# Patient Record
Sex: FEMALE | Race: WHITE | NOT HISPANIC OR LATINO | Employment: OTHER | URBAN - NONMETROPOLITAN AREA
[De-identification: names, ages, dates, MRNs, and addresses within clinical notes are randomized per-mention and may not be internally consistent; named-entity substitution may affect disease eponyms.]

---

## 2017-03-15 ENCOUNTER — OFFICE VISIT (OUTPATIENT)
Dept: SURGERY | Facility: CLINIC | Age: 52
End: 2017-03-15

## 2017-03-15 VITALS — BODY MASS INDEX: 44.26 KG/M2 | WEIGHT: 282 LBS | HEIGHT: 67 IN

## 2017-03-15 DIAGNOSIS — M72.6 NECROTIZING FASCIITIS (HCC): Primary | ICD-10-CM

## 2017-03-15 PROCEDURE — 99212 OFFICE O/P EST SF 10 MIN: CPT | Performed by: SURGERY

## 2017-03-15 RX ORDER — INSULIN DETEMIR 100 [IU]/ML
INJECTION, SOLUTION SUBCUTANEOUS
Refills: 12 | COMMUNITY
Start: 2017-01-26

## 2017-03-15 RX ORDER — ERGOCALCIFEROL 1.25 MG/1
CAPSULE ORAL
Refills: 0 | COMMUNITY
Start: 2017-02-07

## 2017-03-15 RX ORDER — INSULIN ASPART 100 [IU]/ML
INJECTION, SUSPENSION SUBCUTANEOUS
Refills: 12 | COMMUNITY
Start: 2017-02-03

## 2017-03-15 RX ORDER — FUROSEMIDE 20 MG/1
TABLET ORAL
Refills: 2 | COMMUNITY
Start: 2016-12-30

## 2017-03-15 RX ORDER — LEVOTHYROXINE SODIUM 0.03 MG/1
TABLET ORAL
Refills: 12 | COMMUNITY
Start: 2016-12-30

## 2017-03-15 RX ORDER — EMPAGLIFLOZIN 25 MG/1
TABLET, FILM COATED ORAL
Refills: 5 | COMMUNITY
Start: 2016-12-30

## 2017-03-15 RX ORDER — GABAPENTIN 600 MG/1
TABLET ORAL
Refills: 0 | COMMUNITY
Start: 2017-01-31

## 2017-03-15 NOTE — PROGRESS NOTES
Subjective   Loan Yates is a 51 y.o. female  is here today for follow-up.         Loan Yates is a 51 y.o. female here for follow up  After excisional debridement for necrotizing fasciitis of th lower abdomen.  The patient had extensive debridement of skin and subcutaneous fat fascia and muscle of the anterior abdominal wall.    General Appearance:  awake, alert, oriented, in no acute distress and  obese  Skin:   abdominal wound granlating, defect large measuring [id number] with depth of 10cm  Lungs:  Normal expansion.  Clear to auscultation.  No rales, rhonchi, or wheezing.  Heart:  Heart regular rate and rhythm  Abdomen:  Soft, non-tender, normal bowel sounds; no bruits, organomegaly or masses.  See skin exam  Extremities: Extremities warm to touch, pink, with no edema.  Musculoskeletal:  negative          Loan was seen today for hospital follow up i&d.    Diagnoses and all orders for this visit:    Necrotizing fasciitis        Assessment     Loan Yates is a 51 y.o. female with necrotizing fasciitis of the anterior abdominal wall.  Her wound is granulating well and she will continue BID dressing changes with saline guaze.  She will follow up in 1 month.

## 2017-03-16 ENCOUNTER — TELEPHONE (OUTPATIENT)
Dept: SURGERY | Facility: CLINIC | Age: 52
End: 2017-03-16

## 2017-03-16 NOTE — TELEPHONE ENCOUNTER
Called Loan and got her physical address and set her up with [facility] I spoke with Enriqueta and gave her the Dx and everything this morning she states she would get everything set up immediately. Loan is aware.

## 2017-04-12 ENCOUNTER — OFFICE VISIT (OUTPATIENT)
Dept: SURGERY | Facility: CLINIC | Age: 52
End: 2017-04-12

## 2017-04-12 VITALS — BODY MASS INDEX: 43.95 KG/M2 | WEIGHT: 280 LBS | HEIGHT: 67 IN

## 2017-04-12 DIAGNOSIS — M72.6 NECROTIZING FASCIITIS (HCC): Primary | ICD-10-CM

## 2017-04-12 PROCEDURE — 99212 OFFICE O/P EST SF 10 MIN: CPT | Performed by: SURGERY

## 2017-04-13 NOTE — PROGRESS NOTES
Subjective   Loan Yates is a 51 y.o. female  is here today for follow-up.         Loan Yates is a 51 y.o. female here for follow up for a wound check after large abdominal wall debridement for necrotizing fasciitis.  She's been performing wet-to-dry twice a day dressing changes at home since discharge and her wound is granulating nicely.  The wound is completely filled with granulation tissue with the exception of 2 lateral tunnels better approximately 3 cm deep.  There is no evidence of persistent infection.  The surface of the wound now measures 8 cm x 7 cm.      Loan was seen today for monthly follow up for i&d.    Diagnoses and all orders for this visit:    Necrotizing fasciitis        Assessment     Loan Yates is a 51 y.o. female with open wound is healing nicely after excisional debridement for necrotizing fasciitis.  The patient will continue current wound care and follow-up in 1 month.

## 2017-05-16 ENCOUNTER — OFFICE VISIT (OUTPATIENT)
Dept: SURGERY | Facility: CLINIC | Age: 52
End: 2017-05-16

## 2017-05-16 VITALS — WEIGHT: 280 LBS | BODY MASS INDEX: 43.95 KG/M2 | HEIGHT: 67 IN

## 2017-05-16 DIAGNOSIS — M72.6 NECROTIZING FASCIITIS (HCC): Primary | ICD-10-CM

## 2017-05-16 PROCEDURE — 99212 OFFICE O/P EST SF 10 MIN: CPT | Performed by: SURGERY

## 2018-02-06 ENCOUNTER — OFFICE VISIT (OUTPATIENT)
Dept: NEUROSURGERY | Facility: CLINIC | Age: 53
End: 2018-02-06

## 2018-02-06 ENCOUNTER — APPOINTMENT (OUTPATIENT)
Dept: LAB | Facility: HOSPITAL | Age: 53
End: 2018-02-06

## 2018-02-06 VITALS
TEMPERATURE: 97.2 F | HEIGHT: 67 IN | WEIGHT: 281.6 LBS | SYSTOLIC BLOOD PRESSURE: 110 MMHG | DIASTOLIC BLOOD PRESSURE: 80 MMHG | BODY MASS INDEX: 44.2 KG/M2

## 2018-02-06 DIAGNOSIS — M51.36 DEGENERATIVE DISC DISEASE, LUMBAR: Primary | ICD-10-CM

## 2018-02-06 DIAGNOSIS — E13.8 DIABETES MELLITUS OF OTHER TYPE WITH COMPLICATION, UNSPECIFIED LONG TERM INSULIN USE STATUS: ICD-10-CM

## 2018-02-06 LAB — HBA1C MFR BLD: 11.9 % (ref 4.8–5.6)

## 2018-02-06 PROCEDURE — 83036 HEMOGLOBIN GLYCOSYLATED A1C: CPT | Performed by: NEUROLOGICAL SURGERY

## 2018-02-06 PROCEDURE — 99203 OFFICE O/P NEW LOW 30 MIN: CPT | Performed by: NEUROLOGICAL SURGERY

## 2018-02-06 PROCEDURE — 36415 COLL VENOUS BLD VENIPUNCTURE: CPT | Performed by: NEUROLOGICAL SURGERY

## 2018-02-06 RX ORDER — NABUMETONE 750 MG/1
750 TABLET, FILM COATED ORAL 2 TIMES DAILY
Qty: 60 TABLET | Refills: 0 | Status: SHIPPED | OUTPATIENT
Start: 2018-02-06 | End: 2018-03-15 | Stop reason: SDUPTHER

## 2018-02-06 RX ORDER — METHOCARBAMOL 750 MG/1
750 TABLET, FILM COATED ORAL NIGHTLY
Qty: 30 TABLET | Refills: 0 | Status: SHIPPED | OUTPATIENT
Start: 2018-02-06 | End: 2018-03-08

## 2018-02-06 NOTE — PROGRESS NOTES
Loan COSTA [facility]  1965  [id number]      Chief Complaint   Patient presents with   • Back Pain   • Leg Pain     right       HISTORY OF PRESENT ILLNESS:   This is a 52-year-old diabetic female referred with a chief complaint low back pain.  The pain is axial and nonradicular.  She offers no symptoms to suggest nerve root or spinal cord compression.  Lumbar spine x-rays were obtained and she referred for neurosurgical consultation.    Past Medical History:   Diagnosis Date   • Diabetes mellitus        Past Surgical History:   Procedure Laterality Date   •  SECTION     • INCISION AND DRAINAGE ABSCESS     • LAPAROSCOPIC CHOLECYSTECTOMY         Family History   Problem Relation Age of Onset   • Diabetes Mother    • Heart disease Mother    • Hypertension Mother    • Diabetes Father    • Heart disease Father    • Hypertension Father    • Diabetes Sister    • Cancer Brother        Social History     Social History   • Marital status: Legally      Spouse name: N/A   • Number of children: N/A   • Years of education: N/A     Occupational History   • Not on file.     Social History Main Topics   • Smoking status: Current Every Day Smoker     Packs/day: 0.50     Types: Cigarettes   • Smokeless tobacco: Not on file   • Alcohol use No   • Drug use: No   • Sexual activity: Defer     Other Topics Concern   • Not on file     Social History Narrative       Allergies   Allergen Reactions   • Glucocerebrosidase Replenishers          Current Outpatient Prescriptions:   •  furosemide (LASIX) 20 MG tablet, , Disp: , Rfl: 2  •  gabapentin (NEURONTIN) 600 MG tablet, , Disp: , Rfl: 0  •  JARDIANCE 25 MG tablet, , Disp: , Rfl: 5  •  LEVEMIR FLEXTOUCH 100 UNIT/ML injection, , Disp: , Rfl: 12  •  levothyroxine (SYNTHROID, LEVOTHROID) 25 MCG tablet, , Disp: , Rfl: 12  •  NOVOLOG MIX 70/30 FLEXPEN (70-30) 100 UNIT/ML suspension pen-injector injection, , Disp: , Rfl: 12  •  vitamin D (ERGOCALCIFEROL) 70285 UNITS capsule capsule,  , Disp: , Rfl: 0    Review of Systems   Constitutional: Positive for activity change. Negative for appetite change, chills, diaphoresis, fatigue, fever and unexpected weight change.   HENT: Negative for congestion, dental problem, drooling, ear discharge, ear pain, facial swelling, hearing loss, mouth sores, nosebleeds, postnasal drip, rhinorrhea, sinus pressure, sneezing, sore throat, tinnitus, trouble swallowing and voice change.    Eyes: Negative for photophobia, pain, discharge, redness, itching and visual disturbance.   Respiratory: Negative for apnea, cough, choking, chest tightness, shortness of breath, wheezing and stridor.    Cardiovascular: Positive for leg swelling. Negative for chest pain and palpitations.   Gastrointestinal: Negative for abdominal distention, abdominal pain, anal bleeding, blood in stool, constipation, diarrhea, nausea, rectal pain and vomiting.   Endocrine: Positive for polydipsia. Negative for cold intolerance, heat intolerance, polyphagia and polyuria.   Genitourinary: Negative for decreased urine volume, difficulty urinating, dysuria, enuresis, flank pain, frequency, genital sores, hematuria and urgency.   Musculoskeletal: Positive for arthralgias, back pain, gait problem, joint swelling, myalgias, neck pain and neck stiffness.   Skin: Negative for color change, pallor, rash and wound.   Allergic/Immunologic: Positive for environmental allergies. Negative for food allergies and immunocompromised state.   Neurological: Positive for numbness and headaches. Negative for dizziness, tremors, seizures, syncope, facial asymmetry, speech difficulty, weakness and light-headedness.   Hematological: Negative for adenopathy. Does not bruise/bleed easily.   Psychiatric/Behavioral: Negative for agitation, behavioral problems, confusion, decreased concentration, dysphoric mood, hallucinations, self-injury, sleep disturbance and suicidal ideas. The patient is not nervous/anxious and is not  "hyperactive.        Vitals:    02/06/18 1209   BP: 110/80   Temp: 97.2 °F (36.2 °C)   Weight: 128 kg (281 lb 9.6 oz)   Height: 170.2 cm (67.01\")       Neurological Examination:    Mental status/speech: The patient is alert and oriented.  Speech is clear without aphysia or dysarthria.  No overt cognitive deficits.    Cranial nerve examination:    Olfaction: Smell is intact.  Vision: Vision is intact without visual field abnormalities.  Funduscopic examination is normal.  No pupillary irregularity.  Ocular motor examination: The extraocular muscles are intact.  There is no diplopia.  The pupil is round and reactive to both light and accommodation.  There is no nystagmus.  Facial movement/sensation: There is no facial weakness.  Sensation is intact in the first, second, and third divisions of the trigeminal nerve.  The corneal reflex is intact.  Auditory: Hearing is intact to finger rub bilaterally.  Cranial nerves IX, X, XI, XII: Phonation is normal.  No dysphagia.  Tongue is protruded in the midline without atrophy.  The gag reflex is intact.  Shoulder shrug is normal.    Musculoligamentous ligamentous examination: She is obese with limitation range of motion of the spine.  Straight leg raising [place] flip and Scovill test are negative.  There is no weakness sensory loss or reflex asymmetry.     Medical Decision Making:     Diagnostic Data Set:  Lumbar spine x-ray show the presence of degenerative osteoarthritis.  It is been interpreted as showing compression fracture of T12.  However this is not a compression fracture rather than degenerative changes in the upper lumbar lower thoracic area consistent with Scheuermann's disease.      Assessment:  Degenerative osteoarthritis          Recommendations:  He has degenerative osteoarthritis superimposed of bone obesity inactivity and a poor conditioning.  Surgery is not a consideration.  I've given her prescription of Relafen 750 mg twice a day; Robaxin 750 mg at night and " have sent her to physical therapy at [facility] and [facility].  She will follow-up with me in the [place] [facility] clinic in 4 weeks.  I have obtained an A1c blood determination today.  I have told her that is imperative that she lose weight and began to exercise.  I will follow through with this and keep you informed.        I greatly appreciate the opportunity to see and evaluate this individual.  If you have questions or concerns regarding issues that I may have overlooked please call me at any time: [phone].  Gurinder Laguna M.D.  [facility]  [address] [place] [address]    Scribed for Saul Laguna MD by Chel Medina CMA. 2/6/2018  12:37 PM     I have read and concur with the information provided by the scribe.  Saul Laguna MD

## 2018-03-13 RX ORDER — VARENICLINE TARTRATE 1 MG/1
TABLET, FILM COATED ORAL
Refills: 1 | COMMUNITY
Start: 2018-02-01

## 2018-03-13 RX ORDER — IBUPROFEN 800 MG/1
TABLET ORAL
Refills: 1 | COMMUNITY
Start: 2018-01-02

## 2018-03-13 RX ORDER — ROSUVASTATIN CALCIUM 10 MG/1
TABLET, COATED ORAL
Refills: 11 | COMMUNITY
Start: 2018-02-16

## 2018-03-13 RX ORDER — CETIRIZINE HYDROCHLORIDE 10 MG/1
TABLET ORAL
Refills: 2 | COMMUNITY
Start: 2018-02-05

## 2018-03-13 RX ORDER — HYDROCODONE BITARTRATE AND ACETAMINOPHEN 5; 325 MG/1; MG/1
TABLET ORAL
Refills: 0 | COMMUNITY
Start: 2018-01-25

## 2018-03-13 RX ORDER — LISINOPRIL 5 MG/1
TABLET ORAL
Refills: 11 | COMMUNITY
Start: 2018-02-15

## 2018-03-13 RX ORDER — TRIAMCINOLONE ACETONIDE 0.25 MG/G
CREAM TOPICAL
Refills: 3 | COMMUNITY
Start: 2018-01-27

## 2018-03-15 ENCOUNTER — OFFICE VISIT (OUTPATIENT)
Dept: NEUROSURGERY | Facility: CLINIC | Age: 53
End: 2018-03-15

## 2018-03-15 VITALS
WEIGHT: 281 LBS | SYSTOLIC BLOOD PRESSURE: 124 MMHG | BODY MASS INDEX: 42.59 KG/M2 | OXYGEN SATURATION: 98 % | DIASTOLIC BLOOD PRESSURE: 75 MMHG | HEIGHT: 68 IN | HEART RATE: 85 BPM

## 2018-03-15 DIAGNOSIS — M72.6 NECROTIZING FASCIITIS (HCC): ICD-10-CM

## 2018-03-15 DIAGNOSIS — M51.36 DEGENERATIVE DISC DISEASE, LUMBAR: ICD-10-CM

## 2018-03-15 PROCEDURE — 99213 OFFICE O/P EST LOW 20 MIN: CPT | Performed by: NEUROLOGICAL SURGERY

## 2018-03-15 RX ORDER — NABUMETONE 750 MG/1
750 TABLET, FILM COATED ORAL 2 TIMES DAILY
Qty: 60 TABLET | Refills: 2 | Status: SHIPPED | OUTPATIENT
Start: 2018-03-15

## 2018-03-15 RX ORDER — METHOCARBAMOL 750 MG/1
750 TABLET, FILM COATED ORAL NIGHTLY
Qty: 30 TABLET | Refills: 2 | Status: SHIPPED | OUTPATIENT
Start: 2018-03-15 | End: 2018-04-14

## 2018-03-15 NOTE — PROGRESS NOTES
Loan COSTA Trudy  1965  [id number]                       CURRENT WORKING DIAGNOSIS:  Degenerative osteoarthritis          MEDICAL HISTORY SINCE LAST ENCOUNTER:  This patient is better with physical therapy, Relafen and Robaxin.  Her pain is less and she is more mobile.  She reports for follow-up.           Past Medical History:   Diagnosis Date   • Diabetes mellitus               Past Surgical History:   Procedure Laterality Date   •  SECTION     • INCISION AND DRAINAGE ABSCESS     • LAPAROSCOPIC CHOLECYSTECTOMY                Family History   Problem Relation Age of Onset   • Diabetes Mother    • Heart disease Mother    • Hypertension Mother    • Diabetes Father    • Heart disease Father    • Hypertension Father    • Diabetes Sister    • Cancer Brother               Social History     Social History   • Marital status: Legally      Spouse name: N/A   • Number of children: N/A   • Years of education: N/A     Occupational History   • Not on file.     Social History Main Topics   • Smoking status: Current Every Day Smoker     Packs/day: 0.50     Types: Cigarettes   • Smokeless tobacco: Not on file   • Alcohol use No   • Drug use: No   • Sexual activity: Defer     Other Topics Concern   • Not on file     Social History Narrative   • No narrative on file              Allergies   Allergen Reactions   • Glucocerebrosidase Replenishers               Current Outpatient Prescriptions:   •  cetirizine (zyrTEC) 10 MG tablet, , Disp: , Rfl: 2  •  CHANTIX CONTINUING MONTH GUADALUPE 1 MG tablet, , Disp: , Rfl: 1  •  furosemide (LASIX) 20 MG tablet, , Disp: , Rfl: 2  •  gabapentin (NEURONTIN) 600 MG tablet, , Disp: , Rfl: 0  •  HYDROcodone-acetaminophen (NORCO) 5-325 MG per tablet, , Disp: , Rfl: 0  •  ibuprofen (ADVIL,MOTRIN) 800 MG tablet, , Disp: , Rfl: 1  •  JARDIANCE 25 MG tablet, , Disp: , Rfl: 5  •  LEVEMIR FLEXTOUCH 100 UNIT/ML injection, , Disp: , Rfl: 12  •  levothyroxine (SYNTHROID, LEVOTHROID) 25 MCG  "tablet, , Disp: , Rfl: 12  •  lisinopril (PRINIVIL,ZESTRIL) 5 MG tablet, , Disp: , Rfl: 11  •  nabumetone (RELAFEN) 750 MG tablet, Take 1 tablet by mouth 2 (Two) Times a Day., Disp: 60 tablet, Rfl: 0  •  NOVOLOG MIX 70/30 FLEXPEN (70-30) 100 UNIT/ML suspension pen-injector injection, , Disp: , Rfl: 12  •  rosuvastatin (CRESTOR) 10 MG tablet, , Disp: , Rfl: 11  •  triamcinolone (KENALOG) 0.025 % cream, , Disp: , Rfl: 3  •  vitamin D (ERGOCALCIFEROL) 89972 UNITS capsule capsule, , Disp: , Rfl: 0  •  VOLTAREN 1 % gel gel, , Disp: , Rfl: 0         Review of Systems   Musculoskeletal: Positive for back pain.   Allergic/Immunologic: Positive for environmental allergies.               Vitals:    03/15/18 0856   BP: 124/75   Pulse: 85   SpO2: 98%   Weight: 127 kg (281 lb)   Height: 172.7 cm (68\")               EXAMINATION: She remains obese with limitation of range of motion.  She is areflexic.  Her strength however is intact.            MEDICAL DECISION MAKING: She has shown improvement with physical therapy.  Unfortunately she has not lost weight.  Furthermore her A1c is 11.2.  Obviously, she is poorly compliant.           ASSESSMENT/DISPOSITION: She will return to see me in 1 month for a weigh-in.  It is important that we try to get her to lose at least 5 pounds each month.  She is not going to be able to do this with exercise therefore we'll have to do it with dieting.              I APPRECIATE THE OPPORTUNITY OF THIS REFERRAL. PLEASE CALL IF ANY       QUESTIONS [phone]    "

## 2018-03-19 ENCOUNTER — DOCUMENTATION (OUTPATIENT)
Dept: NEUROSURGERY | Facility: CLINIC | Age: 53
End: 2018-03-19

## 2018-03-19 NOTE — PROGRESS NOTES
Pt's scripts were sent to the wrong pharmacy. I called the pt and verified that she would like them sent to [facility] in [place] on [address]. I called in Robaxin and Ciera, per Dr. Laguna.

## 2018-11-08 ENCOUNTER — APPOINTMENT (OUTPATIENT)
Dept: MAMMOGRAPHY | Facility: HOSPITAL | Age: 53
End: 2018-11-08

## 2018-11-08 ENCOUNTER — APPOINTMENT (OUTPATIENT)
Dept: BONE DENSITY | Facility: HOSPITAL | Age: 53
End: 2018-11-08

## 2019-09-18 ENCOUNTER — HOSPITAL ENCOUNTER (OUTPATIENT)
Dept: GENERAL RADIOLOGY | Facility: HOSPITAL | Age: 54
Discharge: HOME | End: 2019-09-18
Admitting: FAMILY MEDICINE

## 2019-09-18 ENCOUNTER — HOSPITAL ENCOUNTER (OUTPATIENT)
Dept: GENERAL RADIOLOGY | Facility: HOSPITAL | Age: 54
Discharge: HOME | End: 2019-09-18

## 2019-09-18 ENCOUNTER — TRANSCRIBE ORDERS (OUTPATIENT)
Dept: LAB | Facility: HOSPITAL | Age: 54
End: 2019-09-18

## 2019-09-18 DIAGNOSIS — M54.6 PAIN IN THORACIC SPINE: ICD-10-CM

## 2019-09-18 DIAGNOSIS — M54.2 CERVICALGIA: ICD-10-CM

## 2019-09-18 DIAGNOSIS — M54.2 CERVICALGIA: Primary | ICD-10-CM

## 2019-09-18 PROCEDURE — 72050 X-RAY EXAM NECK SPINE 4/5VWS: CPT | Performed by: RADIOLOGY

## 2019-09-18 PROCEDURE — 72074 X-RAY EXAM THORAC SPINE4/>VW: CPT

## 2019-09-18 PROCEDURE — 72050 X-RAY EXAM NECK SPINE 4/5VWS: CPT

## 2019-09-18 PROCEDURE — 72072 X-RAY EXAM THORAC SPINE 3VWS: CPT | Performed by: RADIOLOGY

## 2021-05-04 ENCOUNTER — TRANSCRIBE ORDERS (OUTPATIENT)
Dept: ADMINISTRATIVE | Facility: HOSPITAL | Age: 56
End: 2021-05-04

## 2021-05-04 DIAGNOSIS — Z01.818 OTHER SPECIFIED PRE-OPERATIVE EXAMINATION: Primary | ICD-10-CM

## 2022-01-31 ENCOUNTER — TRANSCRIBE ORDERS (OUTPATIENT)
Dept: LAB | Facility: HOSPITAL | Age: 57
End: 2022-01-31

## 2022-01-31 ENCOUNTER — HOSPITAL ENCOUNTER (OUTPATIENT)
Dept: ULTRASOUND IMAGING | Facility: HOSPITAL | Age: 57
Discharge: HOME | End: 2022-01-31
Admitting: NURSE PRACTITIONER

## 2022-01-31 DIAGNOSIS — R22.1 NECK MASS: Primary | ICD-10-CM

## 2022-01-31 DIAGNOSIS — R22.1 NECK MASS: ICD-10-CM

## 2022-01-31 PROCEDURE — 76536 US EXAM OF HEAD AND NECK: CPT

## 2022-01-31 PROCEDURE — 76536 US EXAM OF HEAD AND NECK: CPT | Performed by: RADIOLOGY
